# Patient Record
(demographics unavailable — no encounter records)

---

## 2024-11-06 NOTE — HISTORY OF PRESENT ILLNESS
[FreeTextEntry1] : 5/3/21: 64 year old man with history of GG1 prostate cancer s/p brachytherapy 5/2017 who presents with gross hematuria. Patient had abdominal pain that awoke him from sleep one week ago. This prompted ED visit. In ED, he had microscopic hematuria on UA. He had unremarkable CT scan. He was told he may have passed a kidney stone. Since then, he has started to notice gross hematuria. He has terminal dysuria with frequency, incomplete emptying and weak stream. No fevers, chills, cloudy urine, foul smelling urine or flank pain.  HIs PSA values have been:  4.9 5/2012 3.8 11/2017 3.5 2/2018 3.1 7/5/2018 1.9 1/18/201 9 0.9 9/17/2019 0.24 11/13/2020  IPSS Questionnaire (AUA-7):  23 Quality of life score (out of 6): 5  5/3/21: Bladder biopsy, path negative for malignancy  11/23/21: Presents for follow up. Doing well. No new urinary issues. Stable LUTS from prior. No recent gross hematuria. Patient has been monitoring UA dipsticks at home, which have been negative. Planning for umbilical hernia repair next year.  11/11/22: Doing well. Stopped taking flomax with no significant change in urinary symptoms. No fevers, chills, dysuria, hematuria. Takes sildenafil for ED with good response.  IPSS 14, QOL 2, PVR 0 cc  PSA 11/23/21--0.13  5/9/23: Doing well. Urinary symptoms stable. No fevers, chills, dysuria, hematuria. Taking sildenafil with good response. Needs refill for medication.  IPSS 13, QOL 2  PSA 11/17/22--0.16  11/10/23: Doing well. No major changes. Has ongoing nocturia that he is able to manage with fluid management. No fevers, chills, dysuria, hematuria.   IPSS 14, QOL 2, PVR 22 cc  PSA 05/2023--0.11  11/6/24: Here for f/u. No new issues. No episodes of hematuria. Due for PSA today.   PVR: 0 ml

## 2024-11-06 NOTE — ASSESSMENT
[FreeTextEntry1] : 64 year old man with history of GG1 prostate cancer s/p brachytherapy 5/2017, history of gross hematuria s/p negative hematuria evaluation in 5/2021, including negative bladder biopsy for adherent blood clot near the trigone likely secondary to radiation changes. PSA low and stable. No new hematuria. LUTS stable off medication, will continue to observe.    - F/U PSA, UA  - Sildenafil prn  - F/U in 12 months